# Patient Record
Sex: FEMALE | Race: WHITE | NOT HISPANIC OR LATINO | Employment: FULL TIME
[De-identification: names, ages, dates, MRNs, and addresses within clinical notes are randomized per-mention and may not be internally consistent; named-entity substitution may affect disease eponyms.]

---

## 2017-01-26 ENCOUNTER — HOSPITAL (OUTPATIENT)
Dept: OTHER | Age: 82
End: 2017-01-26
Attending: FAMILY MEDICINE

## 2017-01-30 PROBLEM — I10 BENIGN HYPERTENSION: Status: ACTIVE | Noted: 2017-01-30

## 2017-01-30 PROBLEM — J34.89 SINUS DRAINAGE: Status: ACTIVE | Noted: 2017-01-30

## 2017-01-30 PROBLEM — K57.92 DIVERTICULITIS: Status: ACTIVE | Noted: 2017-01-30

## 2017-02-22 ENCOUNTER — APPOINTMENT (OUTPATIENT)
Dept: LAB | Age: 82
End: 2017-02-22
Attending: INTERNAL MEDICINE
Payer: MEDICARE

## 2017-02-22 DIAGNOSIS — D47.3 ESSENTIAL THROMBOCYTOSIS (HCC): ICD-10-CM

## 2017-02-22 PROCEDURE — 85670 THROMBIN TIME PLASMA: CPT | Performed by: INTERNAL MEDICINE

## 2017-02-22 PROCEDURE — 85705 THROMBOPLASTIN INHIBITION: CPT | Performed by: INTERNAL MEDICINE

## 2017-02-22 PROCEDURE — 85300 ANTITHROMBIN III ACTIVITY: CPT | Performed by: INTERNAL MEDICINE

## 2017-02-22 PROCEDURE — 86147 CARDIOLIPIN ANTIBODY EA IG: CPT | Performed by: INTERNAL MEDICINE

## 2017-02-22 PROCEDURE — 85305 CLOT INHIBIT PROT S TOTAL: CPT | Performed by: INTERNAL MEDICINE

## 2017-02-22 PROCEDURE — 85613 RUSSELL VIPER VENOM DILUTED: CPT | Performed by: INTERNAL MEDICINE

## 2017-02-22 PROCEDURE — 86146 BETA-2 GLYCOPROTEIN ANTIBODY: CPT | Performed by: INTERNAL MEDICINE

## 2017-02-22 PROCEDURE — 86038 ANTINUCLEAR ANTIBODIES: CPT | Performed by: INTERNAL MEDICINE

## 2017-02-22 PROCEDURE — 81270 JAK2 GENE: CPT

## 2017-02-22 PROCEDURE — 85306 CLOT INHIBIT PROT S FREE: CPT | Performed by: INTERNAL MEDICINE

## 2017-02-22 PROCEDURE — 81240 F2 GENE: CPT | Performed by: INTERNAL MEDICINE

## 2017-02-22 PROCEDURE — 81241 F5 GENE: CPT | Performed by: INTERNAL MEDICINE

## 2017-02-22 PROCEDURE — 85732 THROMBOPLASTIN TIME PARTIAL: CPT | Performed by: INTERNAL MEDICINE

## 2017-02-22 PROCEDURE — 85303 CLOT INHIBIT PROT C ACTIVITY: CPT | Performed by: INTERNAL MEDICINE

## 2017-02-26 LAB — JAK2 GENE, V617F MUTATION, QUALITATIVE: POSITIVE

## 2017-03-07 PROBLEM — D47.3 ESSENTIAL THROMBOCYTOSIS (HCC): Status: ACTIVE | Noted: 2017-03-07

## 2017-04-11 PROBLEM — K59.00 CONSTIPATION: Status: ACTIVE | Noted: 2017-04-11

## 2017-08-15 ENCOUNTER — HOSPITAL (OUTPATIENT)
Dept: OTHER | Age: 82
End: 2017-08-15
Attending: HOSPITALIST

## 2017-08-15 LAB
ALBUMIN SERPL-MCNC: 3.9 GM/DL (ref 3.6–5.1)
ALBUMIN/GLOB SERPL: 1.1 {RATIO} (ref 1–2.4)
ALP SERPL-CCNC: 81 UNIT/L (ref 45–117)
ALT SERPL-CCNC: 26 UNIT/L
AMORPH SED URNS QL MICRO: ABNORMAL
ANALYZER ANC (IANC): ABNORMAL
ANION GAP SERPL CALC-SCNC: 19 MMOL/L (ref 10–20)
APPEARANCE UR: CLEAR
AST SERPL-CCNC: 31 UNIT/L
BACTERIA #/AREA URNS HPF: ABNORMAL /HPF
BASOPHILS # BLD: 0.1 THOUSAND/MCL (ref 0–0.3)
BASOPHILS NFR BLD: 0 %
BILIRUB SERPL-MCNC: 1.3 MG/DL (ref 0.2–1)
BILIRUB UR QL: NEGATIVE
BUN SERPL-MCNC: 23 MG/DL (ref 6–20)
BUN/CREAT SERPL: 24 (ref 7–25)
CALCIUM SERPL-MCNC: 9.8 MG/DL (ref 8.4–10.2)
CAOX CRY URNS QL MICRO: ABNORMAL
CHLORIDE: 103 MMOL/L (ref 98–107)
CO2 SERPL-SCNC: 22 MMOL/L (ref 21–32)
COLOR UR: ABNORMAL
CREAT SERPL-MCNC: 0.96 MG/DL (ref 0.51–0.95)
DIFFERENTIAL METHOD BLD: ABNORMAL
EOSINOPHIL # BLD: 0 THOUSAND/MCL (ref 0.1–0.5)
EOSINOPHIL NFR BLD: 0 %
EPITH CASTS #/AREA URNS LPF: ABNORMAL /[LPF]
ERYTHROCYTE [DISTWIDTH] IN BLOOD: 13.3 % (ref 11–15)
FATTY CASTS #/AREA URNS LPF: ABNORMAL /[LPF]
GLOBULIN SER-MCNC: 3.4 GM/DL (ref 2–4)
GLUCOSE SERPL-MCNC: 214 MG/DL (ref 65–99)
GLUCOSE UR-MCNC: NEGATIVE MG/DL
GRAN CASTS #/AREA URNS LPF: ABNORMAL /[LPF]
HEMATOCRIT: 50.6 % (ref 36–46.5)
HGB BLD-MCNC: 18 GM/DL (ref 12–15.5)
HGB UR QL: NEGATIVE
HYALINE CASTS #/AREA URNS LPF: ABNORMAL /LPF (ref 0–5)
KETONES UR-MCNC: ABNORMAL MG/DL
LACTATE BLDV-MCNC: 2.9 MMOL/L
LACTATE BLDV-MCNC: 5.1 MMOL/L
LEUKOCYTE ESTERASE UR QL STRIP: NEGATIVE
LIPASE SERPL-CCNC: 165 UNIT/L (ref 73–393)
LYMPHOCYTES # BLD: 1.8 THOUSAND/MCL (ref 1–4)
LYMPHOCYTES NFR BLD: 6 %
MCH RBC QN AUTO: 30.5 PG (ref 26–34)
MCHC RBC AUTO-ENTMCNC: 35.6 GM/DL (ref 32–36.5)
MCV RBC AUTO: 85.6 FL (ref 78–100)
MIXED CELL CASTS #/AREA URNS LPF: ABNORMAL /[LPF]
MONOCYTES # BLD: 1 THOUSAND/MCL (ref 0.3–0.9)
MONOCYTES NFR BLD: 3 %
MUCOUS THREADS URNS QL MICRO: PRESENT
NEUTROPHILS # BLD: 27.7 THOUSAND/MCL (ref 1.8–7.7)
NEUTROPHILS NFR BLD: 91 %
NEUTS SEG NFR BLD: ABNORMAL %
NITRITE UR QL: NEGATIVE
PERCENT NRBC: 0
PH UR: 5 UNIT (ref 5–7)
PLATELET # BLD: 494 THOUSAND/MCL (ref 140–450)
POTASSIUM SERPL-SCNC: 4 MMOL/L (ref 3.4–5.1)
PROCALCITONIN SERPL IA-MCNC: <0.05 NG/ML
PROT SERPL-MCNC: 7.3 GM/DL (ref 6.4–8.2)
PROT UR QL: NEGATIVE MG/DL
RBC # BLD: 5.91 MILLION/MCL (ref 4–5.2)
RBC #/AREA URNS HPF: ABNORMAL /HPF (ref 0–3)
RBC CASTS #/AREA URNS LPF: ABNORMAL /[LPF]
RENAL EPI CELLS #/AREA URNS HPF: ABNORMAL /[HPF]
SODIUM SERPL-SCNC: 140 MMOL/L (ref 135–145)
SP GR UR: 1.01 (ref 1–1.03)
SPECIMEN SOURCE: ABNORMAL
SPERM URNS QL MICRO: ABNORMAL
SQUAMOUS #/AREA URNS HPF: ABNORMAL /HPF (ref 0–5)
T VAGINALIS URNS QL MICRO: ABNORMAL
TRI-PHOS CRY URNS QL MICRO: ABNORMAL
URATE CRY URNS QL MICRO: ABNORMAL
URINE REFLEX: ABNORMAL
URNS CMNT MICRO: ABNORMAL
UROBILINOGEN UR QL: 4 MG/DL (ref 0–1)
WAXY CASTS #/AREA URNS LPF: ABNORMAL /[LPF]
WBC # BLD: 30.6 THOUSAND/MCL (ref 4.2–11)
WBC #/AREA URNS HPF: ABNORMAL /HPF (ref 0–5)
WBC CASTS #/AREA URNS LPF: ABNORMAL /[LPF]
YEAST HYPHAE URNS QL MICRO: ABNORMAL
YEAST URNS QL MICRO: ABNORMAL

## 2017-08-16 ENCOUNTER — DIAGNOSTIC TRANS (OUTPATIENT)
Dept: OTHER | Age: 82
End: 2017-08-16

## 2017-08-16 ENCOUNTER — CHARTING TRANS (OUTPATIENT)
Dept: OTHER | Age: 82
End: 2017-08-16

## 2017-08-16 LAB
ANALYZER ANC (IANC): ABNORMAL
ANION GAP SERPL CALC-SCNC: 13 MMOL/L (ref 10–20)
BASOPHILS # BLD: 0 THOUSAND/MCL (ref 0–0.3)
BASOPHILS NFR BLD: 0 %
BUN SERPL-MCNC: 21 MG/DL (ref 6–20)
BUN/CREAT SERPL: 21 (ref 7–25)
CALCIUM SERPL-MCNC: 7.7 MG/DL (ref 8.4–10.2)
CHLORIDE: 108 MMOL/L (ref 98–107)
CO2 SERPL-SCNC: 23 MMOL/L (ref 21–32)
CREAT SERPL-MCNC: 0.98 MG/DL (ref 0.51–0.95)
DIFFERENTIAL METHOD BLD: ABNORMAL
EOSINOPHIL # BLD: 0 THOUSAND/MCL (ref 0.1–0.5)
EOSINOPHIL NFR BLD: 0 %
ERYTHROCYTE [DISTWIDTH] IN BLOOD: 13.5 % (ref 11–15)
GLUCOSE BLDC GLUCOMTR-MCNC: 105 MG/DL (ref 65–99)
GLUCOSE BLDC GLUCOMTR-MCNC: 113 MG/DL (ref 65–99)
GLUCOSE BLDC GLUCOMTR-MCNC: 127 MG/DL (ref 65–99)
GLUCOSE BLDC GLUCOMTR-MCNC: 150 MG/DL (ref 65–99)
GLUCOSE BLDC GLUCOMTR-MCNC: 75 MG/DL (ref 65–99)
GLUCOSE SERPL-MCNC: 132 MG/DL (ref 65–99)
GLUCOSE SERPL-MCNC: 186 MG/DL (ref 65–99)
HEMATOCRIT: 38.4 % (ref 36–46.5)
HEMATOCRIT: 43 % (ref 36–46.5)
HGB BLD-MCNC: 13.2 GM/DL (ref 12–15.5)
HGB BLD-MCNC: 14.9 GM/DL (ref 12–15.5)
LACTATE BLDV-SCNC: 0.9 MMOL/L (ref 0–2)
LACTATE BLDV-SCNC: 1.7 MMOL/L (ref 0–2)
LACTATE BLDV-SCNC: 2.6 MMOL/L (ref 0–2)
LYMPHOCYTES # BLD: 0.7 THOUSAND/MCL (ref 1–4)
LYMPHOCYTES NFR BLD: 3 %
MCH RBC QN AUTO: 29.3 PG (ref 26–34)
MCHC RBC AUTO-ENTMCNC: 34.7 GM/DL (ref 32–36.5)
MCV RBC AUTO: 84.5 FL (ref 78–100)
MONOCYTES # BLD: 0.9 THOUSAND/MCL (ref 0.3–0.9)
MONOCYTES NFR BLD: 4 %
NEUTROPHILS # BLD: 20.1 THOUSAND/MCL (ref 1.8–7.7)
NEUTROPHILS NFR BLD: 93 %
NEUTS SEG NFR BLD: ABNORMAL %
PERCENT NRBC: ABNORMAL
PLATELET # BLD: 383 THOUSAND/MCL (ref 140–450)
POTASSIUM SERPL-SCNC: 4.2 MMOL/L (ref 3.4–5.1)
RBC # BLD: 5.09 MILLION/MCL (ref 4–5.2)
SODIUM SERPL-SCNC: 140 MMOL/L (ref 135–145)
WBC # BLD: 21.8 THOUSAND/MCL (ref 4.2–11)

## 2017-08-17 LAB
ANALYZER ANC (IANC): ABNORMAL
ANION GAP SERPL CALC-SCNC: 11 MMOL/L (ref 10–20)
BUN SERPL-MCNC: 12 MG/DL (ref 6–20)
BUN/CREAT SERPL: 15 (ref 7–25)
CALCIUM SERPL-MCNC: 7.7 MG/DL (ref 8.4–10.2)
CHLORIDE: 109 MMOL/L (ref 98–107)
CMV DNA # BLD NAA+PROBE: NOT DETECTED UNIT/ML
CMV DNA SERPL NAA+PROBE-LOG#: NOT DETECTED UNIT/ML
CMV IGG SERPL IA-ACNC: 4.02 ISR
CMV IGM SERPL IA-ACNC: 0.39 OD
CO2 SERPL-SCNC: 23 MMOL/L (ref 21–32)
CREAT SERPL-MCNC: 0.82 MG/DL (ref 0.51–0.95)
ERYTHROCYTE [DISTWIDTH] IN BLOOD: 13.9 % (ref 11–15)
GLUCOSE BLDC GLUCOMTR-MCNC: 103 MG/DL (ref 65–99)
GLUCOSE BLDC GLUCOMTR-MCNC: 110 MG/DL (ref 65–99)
GLUCOSE BLDC GLUCOMTR-MCNC: 87 MG/DL (ref 65–99)
GLUCOSE BLDC GLUCOMTR-MCNC: 94 MG/DL (ref 65–99)
GLUCOSE SERPL-MCNC: 97 MG/DL (ref 65–99)
GLYCOHEMOGLOBIN: 5 % (ref 4.5–5.6)
HEMATOCRIT: 38.3 % (ref 36–46.5)
HEMATOCRIT: 40.2 % (ref 36–46.5)
HGB BLD-MCNC: 12.8 GM/DL (ref 12–15.5)
HGB BLD-MCNC: 13.5 GM/DL (ref 12–15.5)
MAGNESIUM SERPL-MCNC: 1.5 MG/DL (ref 1.7–2.4)
MCH RBC QN AUTO: 28.6 PG (ref 26–34)
MCHC RBC AUTO-ENTMCNC: 33.4 GM/DL (ref 32–36.5)
MCV RBC AUTO: 85.7 FL (ref 78–100)
PHOSPHATE SERPL-MCNC: 2.1 MG/DL (ref 2.4–4.7)
PLATELET # BLD: 294 THOUSAND/MCL (ref 140–450)
POTASSIUM SERPL-SCNC: 3.6 MMOL/L (ref 3.4–5.1)
RBC # BLD: 4.47 MILLION/MCL (ref 4–5.2)
SODIUM SERPL-SCNC: 139 MMOL/L (ref 135–145)
SPECIMEN SOURCE: NORMAL
WBC # BLD: 15 THOUSAND/MCL (ref 4.2–11)

## 2017-08-18 LAB
ANALYZER ANC (IANC): ABNORMAL
ANION GAP SERPL CALC-SCNC: 15 MMOL/L (ref 10–20)
BUN SERPL-MCNC: 11 MG/DL (ref 6–20)
BUN/CREAT SERPL: 14 (ref 7–25)
CALCIUM SERPL-MCNC: 7.6 MG/DL (ref 8.4–10.2)
CHLORIDE: 107 MMOL/L (ref 98–107)
CO2 SERPL-SCNC: 20 MMOL/L (ref 21–32)
CREAT SERPL-MCNC: 0.78 MG/DL (ref 0.51–0.95)
ERYTHROCYTE [DISTWIDTH] IN BLOOD: 13.4 % (ref 11–15)
GLUCOSE BLDC GLUCOMTR-MCNC: 110 MG/DL (ref 65–99)
GLUCOSE BLDC GLUCOMTR-MCNC: 71 MG/DL (ref 65–99)
GLUCOSE BLDC GLUCOMTR-MCNC: 85 MG/DL (ref 65–99)
GLUCOSE SERPL-MCNC: 78 MG/DL (ref 65–99)
HEMATOCRIT: 37.4 % (ref 36–46.5)
HGB BLD-MCNC: 13 GM/DL (ref 12–15.5)
MAGNESIUM SERPL-MCNC: 1.9 MG/DL (ref 1.7–2.4)
MCH RBC QN AUTO: 29.7 PG (ref 26–34)
MCHC RBC AUTO-ENTMCNC: 34.8 GM/DL (ref 32–36.5)
MCV RBC AUTO: 85.4 FL (ref 78–100)
PLATELET # BLD: 280 THOUSAND/MCL (ref 140–450)
POTASSIUM SERPL-SCNC: 3.9 MMOL/L (ref 3.4–5.1)
RBC # BLD: 4.38 MILLION/MCL (ref 4–5.2)
SODIUM SERPL-SCNC: 138 MMOL/L (ref 135–145)
WBC # BLD: 15 THOUSAND/MCL (ref 4.2–11)

## 2017-08-19 LAB
ANALYZER ANC (IANC): ABNORMAL
ANION GAP SERPL CALC-SCNC: 14 MMOL/L (ref 10–20)
BUN SERPL-MCNC: 8 MG/DL (ref 6–20)
BUN/CREAT SERPL: 11 (ref 7–25)
CALCIUM SERPL-MCNC: 7.8 MG/DL (ref 8.4–10.2)
CHLORIDE: 105 MMOL/L (ref 98–107)
CHOLEST SERPL-MCNC: 145 MG/DL
CHOLEST/HDLC SERPL: 2.7 {RATIO}
CO2 SERPL-SCNC: 22 MMOL/L (ref 21–32)
CREAT SERPL-MCNC: 0.75 MG/DL (ref 0.51–0.95)
ERYTHROCYTE [DISTWIDTH] IN BLOOD: 13.2 % (ref 11–15)
GLUCOSE SERPL-MCNC: 101 MG/DL (ref 65–99)
HDLC SERPL-MCNC: 53 MG/DL
HEMATOCRIT: 37.5 % (ref 36–46.5)
HGB BLD-MCNC: 12.9 GM/DL (ref 12–15.5)
LDLC SERPL CALC-MCNC: 70 MG/DL
MCH RBC QN AUTO: 29 PG (ref 26–34)
MCHC RBC AUTO-ENTMCNC: 34.4 GM/DL (ref 32–36.5)
MCV RBC AUTO: 84.3 FL (ref 78–100)
NONHDLC SERPL-MCNC: 92 MG/DL
PLATELET # BLD: 294 THOUSAND/MCL (ref 140–450)
POTASSIUM SERPL-SCNC: 3.7 MMOL/L (ref 3.4–5.1)
RBC # BLD: 4.45 MILLION/MCL (ref 4–5.2)
SODIUM SERPL-SCNC: 137 MMOL/L (ref 135–145)
TRIGLYCERIDE (TRIGP): 112 MG/DL
WBC # BLD: 14.2 THOUSAND/MCL (ref 4.2–11)

## 2017-08-20 LAB
ANALYZER ANC (IANC): ABNORMAL
ANION GAP SERPL CALC-SCNC: 17 MMOL/L (ref 10–20)
BUN SERPL-MCNC: 10 MG/DL (ref 6–20)
BUN/CREAT SERPL: 12 (ref 7–25)
CALCIUM SERPL-MCNC: 7.9 MG/DL (ref 8.4–10.2)
CHLORIDE: 104 MMOL/L (ref 98–107)
CO2 SERPL-SCNC: 22 MMOL/L (ref 21–32)
CREAT SERPL-MCNC: 0.83 MG/DL (ref 0.51–0.95)
ERYTHROCYTE [DISTWIDTH] IN BLOOD: 13.3 % (ref 11–15)
GLUCOSE SERPL-MCNC: 81 MG/DL (ref 65–99)
HEMATOCRIT: 36.7 % (ref 36–46.5)
HGB BLD-MCNC: 12.7 GM/DL (ref 12–15.5)
MAGNESIUM SERPL-MCNC: 1.5 MG/DL (ref 1.7–2.4)
MCH RBC QN AUTO: 29.1 PG (ref 26–34)
MCHC RBC AUTO-ENTMCNC: 34.6 GM/DL (ref 32–36.5)
MCV RBC AUTO: 84 FL (ref 78–100)
PLATELET # BLD: 335 THOUSAND/MCL (ref 140–450)
POTASSIUM SERPL-SCNC: 3.5 MMOL/L (ref 3.4–5.1)
RBC # BLD: 4.37 MILLION/MCL (ref 4–5.2)
SODIUM SERPL-SCNC: 139 MMOL/L (ref 135–145)
WBC # BLD: 16.4 THOUSAND/MCL (ref 4.2–11)

## 2017-08-21 LAB
ANALYZER ANC (IANC): ABNORMAL
ANION GAP SERPL CALC-SCNC: 13 MMOL/L (ref 10–20)
BUN SERPL-MCNC: 6 MG/DL (ref 6–20)
BUN/CREAT SERPL: 8 (ref 7–25)
CALCIUM SERPL-MCNC: 7.8 MG/DL (ref 8.4–10.2)
CHLORIDE: 107 MMOL/L (ref 98–107)
CO2 SERPL-SCNC: 24 MMOL/L (ref 21–32)
CREAT SERPL-MCNC: 0.71 MG/DL (ref 0.51–0.95)
ERYTHROCYTE [DISTWIDTH] IN BLOOD: 13.5 % (ref 11–15)
GLUCOSE BLDC GLUCOMTR-MCNC: 71 MG/DL (ref 65–99)
GLUCOSE SERPL-MCNC: 123 MG/DL (ref 65–99)
HEMATOCRIT: 34.8 % (ref 36–46.5)
HGB BLD-MCNC: 11.9 GM/DL (ref 12–15.5)
MAGNESIUM SERPL-MCNC: 1.6 MG/DL (ref 1.7–2.4)
MCH RBC QN AUTO: 28.8 PG (ref 26–34)
MCHC RBC AUTO-ENTMCNC: 34.2 GM/DL (ref 32–36.5)
MCV RBC AUTO: 84.3 FL (ref 78–100)
PHOSPHATE SERPL-MCNC: 2.3 MG/DL (ref 2.4–4.7)
PLATELET # BLD: 307 THOUSAND/MCL (ref 140–450)
POTASSIUM SERPL-SCNC: 3.8 MMOL/L (ref 3.4–5.1)
RBC # BLD: 4.13 MILLION/MCL (ref 4–5.2)
SODIUM SERPL-SCNC: 140 MMOL/L (ref 135–145)
WBC # BLD: 10.3 THOUSAND/MCL (ref 4.2–11)

## 2017-08-22 LAB
ALBUMIN SERPL-MCNC: 2.1 GM/DL (ref 3.6–5.1)
ALBUMIN/GLOB SERPL: 0.6 {RATIO} (ref 1–2.4)
ALP SERPL-CCNC: 149 UNIT/L (ref 45–117)
ALT SERPL-CCNC: 16 UNIT/L
ANION GAP SERPL CALC-SCNC: 16 MMOL/L (ref 10–20)
AST SERPL-CCNC: 19 UNIT/L
BILIRUB SERPL-MCNC: 0.6 MG/DL (ref 0.2–1)
BUN SERPL-MCNC: 6 MG/DL (ref 6–20)
BUN/CREAT SERPL: 8 (ref 7–25)
CALCIUM SERPL-MCNC: 7.9 MG/DL (ref 8.4–10.2)
CHLORIDE: 104 MMOL/L (ref 98–107)
CO2 SERPL-SCNC: 25 MMOL/L (ref 21–32)
CREAT SERPL-MCNC: 0.71 MG/DL (ref 0.51–0.95)
GLOBULIN SER-MCNC: 3.5 GM/DL (ref 2–4)
GLUCOSE BLDC GLUCOMTR-MCNC: 129 MG/DL (ref 65–99)
GLUCOSE BLDC GLUCOMTR-MCNC: 144 MG/DL (ref 65–99)
GLUCOSE BLDC GLUCOMTR-MCNC: 144 MG/DL (ref 65–99)
GLUCOSE SERPL-MCNC: 120 MG/DL (ref 65–99)
MAGNESIUM SERPL-MCNC: 1.7 MG/DL (ref 1.7–2.4)
PHOSPHATE SERPL-MCNC: 3.1 MG/DL (ref 2.4–4.7)
POTASSIUM SERPL-SCNC: 3.6 MMOL/L (ref 3.4–5.1)
PROT SERPL-MCNC: 5.6 GM/DL (ref 6.4–8.2)
SODIUM SERPL-SCNC: 141 MMOL/L (ref 135–145)

## 2017-08-23 LAB
ANION GAP SERPL CALC-SCNC: 13 MMOL/L (ref 10–20)
BUN SERPL-MCNC: 13 MG/DL (ref 6–20)
BUN/CREAT SERPL: 17 (ref 7–25)
CALCIUM SERPL-MCNC: 8 MG/DL (ref 8.4–10.2)
CHLORIDE: 104 MMOL/L (ref 98–107)
CO2 SERPL-SCNC: 26 MMOL/L (ref 21–32)
CREAT SERPL-MCNC: 0.76 MG/DL (ref 0.51–0.95)
GLUCOSE BLDC GLUCOMTR-MCNC: 106 MG/DL (ref 65–99)
GLUCOSE BLDC GLUCOMTR-MCNC: 107 MG/DL (ref 65–99)
GLUCOSE BLDC GLUCOMTR-MCNC: 117 MG/DL (ref 65–99)
GLUCOSE BLDC GLUCOMTR-MCNC: 129 MG/DL (ref 65–99)
GLUCOSE SERPL-MCNC: 115 MG/DL (ref 65–99)
MAGNESIUM SERPL-MCNC: 1.8 MG/DL (ref 1.7–2.4)
PHOSPHATE SERPL-MCNC: 3.9 MG/DL (ref 2.4–4.7)
POTASSIUM SERPL-SCNC: 4.1 MMOL/L (ref 3.4–5.1)
SODIUM SERPL-SCNC: 139 MMOL/L (ref 135–145)

## 2017-08-24 LAB
ALBUMIN SERPL-MCNC: 2.2 GM/DL (ref 3.6–5.1)
ALBUMIN/GLOB SERPL: 0.7 {RATIO} (ref 1–2.4)
ALP SERPL-CCNC: 112 UNIT/L (ref 45–117)
ALT SERPL-CCNC: 17 UNIT/L
ANION GAP SERPL CALC-SCNC: 9 MMOL/L (ref 10–20)
AST SERPL-CCNC: 25 UNIT/L
BILIRUB SERPL-MCNC: 0.4 MG/DL (ref 0.2–1)
BUN SERPL-MCNC: 14 MG/DL (ref 6–20)
BUN/CREAT SERPL: 19 (ref 7–25)
CALCIUM SERPL-MCNC: 8.2 MG/DL (ref 8.4–10.2)
CHLORIDE: 106 MMOL/L (ref 98–107)
CO2 SERPL-SCNC: 28 MMOL/L (ref 21–32)
CREAT SERPL-MCNC: 0.73 MG/DL (ref 0.51–0.95)
GLOBULIN SER-MCNC: 3.2 GM/DL (ref 2–4)
GLUCOSE BLDC GLUCOMTR-MCNC: 116 MG/DL (ref 65–99)
GLUCOSE BLDC GLUCOMTR-MCNC: 126 MG/DL (ref 65–99)
GLUCOSE BLDC GLUCOMTR-MCNC: 86 MG/DL (ref 65–99)
GLUCOSE SERPL-MCNC: 117 MG/DL (ref 65–99)
MAGNESIUM SERPL-MCNC: 1.8 MG/DL (ref 1.7–2.4)
PHOSPHATE SERPL-MCNC: 3.5 MG/DL (ref 2.4–4.7)
POTASSIUM SERPL-SCNC: 4.2 MMOL/L (ref 3.4–5.1)
PROT SERPL-MCNC: 5.4 GM/DL (ref 6.4–8.2)
SODIUM SERPL-SCNC: 139 MMOL/L (ref 135–145)

## 2018-06-20 PROBLEM — D47.3 ESSENTIAL THROMBOCYTOSIS (HCC): Status: RESOLVED | Noted: 2017-03-07 | Resolved: 2018-06-20

## 2018-06-20 PROBLEM — K59.00 CONSTIPATION: Status: RESOLVED | Noted: 2017-04-11 | Resolved: 2018-06-20

## 2018-06-20 PROBLEM — I10 BENIGN HYPERTENSION: Status: RESOLVED | Noted: 2017-01-30 | Resolved: 2018-06-20

## 2018-06-20 PROBLEM — K57.92 DIVERTICULITIS: Status: RESOLVED | Noted: 2017-01-30 | Resolved: 2018-06-20

## 2018-11-02 PROBLEM — I70.0 ABDOMINAL AORTIC ATHEROSCLEROSIS (HCC): Status: ACTIVE | Noted: 2018-11-02

## 2018-11-02 PROBLEM — M51.37 DEGENERATION OF LUMBAR OR LUMBOSACRAL INTERVERTEBRAL DISC: Status: ACTIVE | Noted: 2018-11-02

## 2018-11-02 PROBLEM — M51.379 DEGENERATION OF LUMBAR OR LUMBOSACRAL INTERVERTEBRAL DISC: Status: ACTIVE | Noted: 2018-11-02

## 2018-11-02 PROBLEM — M54.50 CHRONIC BILATERAL LOW BACK PAIN WITHOUT SCIATICA: Status: ACTIVE | Noted: 2018-11-02

## 2018-11-02 PROBLEM — G89.29 CHRONIC BILATERAL LOW BACK PAIN WITHOUT SCIATICA: Status: ACTIVE | Noted: 2018-11-02

## 2018-11-19 PROBLEM — G89.29 CHRONIC LEFT-SIDED LOW BACK PAIN WITHOUT SCIATICA: Status: ACTIVE | Noted: 2018-11-19

## 2018-11-19 PROBLEM — M25.552 CHRONIC LEFT HIP PAIN: Status: ACTIVE | Noted: 2018-11-19

## 2018-11-19 PROBLEM — M54.50 CHRONIC LEFT-SIDED LOW BACK PAIN WITHOUT SCIATICA: Status: ACTIVE | Noted: 2018-11-19

## 2018-11-19 PROBLEM — G89.29 CHRONIC LEFT HIP PAIN: Status: ACTIVE | Noted: 2018-11-19

## 2018-12-07 PROBLEM — M47.817 LUMBOSACRAL SPONDYLOSIS WITHOUT MYELOPATHY: Status: ACTIVE | Noted: 2018-12-07

## 2018-12-07 PROBLEM — M48.061 LUMBAR FORAMINAL STENOSIS: Status: ACTIVE | Noted: 2018-12-07

## 2018-12-28 PROBLEM — M46.1 SACROILIITIS, NOT ELSEWHERE CLASSIFIED (HCC): Status: ACTIVE | Noted: 2018-12-28

## 2019-01-31 PROBLEM — M70.62 GREATER TROCHANTERIC BURSITIS OF LEFT HIP: Status: ACTIVE | Noted: 2019-01-31

## 2020-05-21 PROBLEM — M16.12 OSTEOARTHRITIS OF LEFT HIP, UNSPECIFIED OSTEOARTHRITIS TYPE: Status: ACTIVE | Noted: 2020-05-21

## 2020-05-21 PROBLEM — M25.552 LEFT HIP PAIN: Status: ACTIVE | Noted: 2018-11-19

## 2020-08-15 ENCOUNTER — HOSPITAL ENCOUNTER (OUTPATIENT)
Dept: CT IMAGING | Age: 85
Discharge: HOME | End: 2020-08-15
Attending: EMERGENCY MEDICINE

## 2020-08-15 ENCOUNTER — WALK IN (OUTPATIENT)
Dept: URGENT CARE | Age: 85
End: 2020-08-15
Attending: EMERGENCY MEDICINE

## 2020-08-15 DIAGNOSIS — R10.9 ABDOMINAL PAIN, UNSPECIFIED ABDOMINAL LOCATION: ICD-10-CM

## 2020-08-15 DIAGNOSIS — R10.32 LLQ PAIN: ICD-10-CM

## 2020-08-15 DIAGNOSIS — R10.32 LLQ PAIN: Primary | ICD-10-CM

## 2020-08-15 PROCEDURE — 99212 OFFICE O/P EST SF 10 MIN: CPT

## 2020-08-15 PROCEDURE — 74176 CT ABD & PELVIS W/O CONTRAST: CPT

## 2020-08-15 RX ORDER — AMLODIPINE BESYLATE 2.5 MG/1
2.5 TABLET ORAL
COMMUNITY
Start: 2020-07-21

## 2020-08-15 RX ORDER — AMOXICILLIN AND CLAVULANATE POTASSIUM 875; 125 MG/1; MG/1
1 TABLET, FILM COATED ORAL 2 TIMES DAILY
Qty: 20 TABLET | Refills: 0 | Status: SHIPPED | OUTPATIENT
Start: 2020-08-15 | End: 2020-08-25

## 2020-08-15 RX ORDER — ASPIRIN 325 MG
TABLET ORAL
COMMUNITY

## 2020-08-15 RX ORDER — CELECOXIB 200 MG/1
200 CAPSULE ORAL
COMMUNITY
Start: 2019-11-21

## 2020-08-15 RX ORDER — METOPROLOL SUCCINATE 50 MG/1
50 TABLET, EXTENDED RELEASE ORAL
COMMUNITY
Start: 2019-07-24

## 2020-08-15 RX ORDER — TIMOLOL MALEATE 5 MG/ML
1 SOLUTION/ DROPS OPHTHALMIC
COMMUNITY
Start: 2019-11-21

## 2020-08-15 RX ORDER — LATANOPROST 50 UG/ML
1 SOLUTION/ DROPS OPHTHALMIC
COMMUNITY
Start: 2019-11-21

## 2020-08-15 SDOH — HEALTH STABILITY: MENTAL HEALTH: HOW OFTEN DO YOU HAVE A DRINK CONTAINING ALCOHOL?: NEVER

## 2020-08-15 ASSESSMENT — PAIN SCALES - GENERAL: PAINLEVEL: 5-6

## 2020-08-15 ASSESSMENT — ENCOUNTER SYMPTOMS
BRUISES/BLEEDS EASILY: 0
CHILLS: 0
HEADACHES: 0
SORE THROAT: 0
ABDOMINAL PAIN: 1
VOMITING: 0
DIZZINESS: 0
BACK PAIN: 0
FEVER: 0
SHORTNESS OF BREATH: 0
COUGH: 0

## 2020-11-04 PROBLEM — N18.30 STAGE 3 CHRONIC KIDNEY DISEASE, UNSPECIFIED WHETHER STAGE 3A OR 3B CKD (HCC): Status: ACTIVE | Noted: 2020-11-04

## 2021-05-26 VITALS
OXYGEN SATURATION: 97 % | HEART RATE: 72 BPM | DIASTOLIC BLOOD PRESSURE: 69 MMHG | WEIGHT: 130 LBS | TEMPERATURE: 98.6 F | RESPIRATION RATE: 18 BRPM | SYSTOLIC BLOOD PRESSURE: 152 MMHG

## 2021-07-02 PROBLEM — D58.2 ELEVATED HEMOGLOBIN (HCC): Status: ACTIVE | Noted: 2021-07-02

## 2021-07-02 PROBLEM — K59.1 FUNCTIONAL DIARRHEA: Status: ACTIVE | Noted: 2021-07-02

## 2021-07-02 PROBLEM — M81.0 AGE-RELATED OSTEOPOROSIS WITHOUT CURRENT PATHOLOGICAL FRACTURE: Status: ACTIVE | Noted: 2021-07-02

## 2021-08-18 PROBLEM — Z15.89 JAK2 V617F MUTATION: Status: ACTIVE | Noted: 2021-08-18

## 2021-08-18 PROBLEM — R10.9 ABDOMINAL CRAMPING: Status: ACTIVE | Noted: 2021-08-18

## 2021-09-29 PROBLEM — D50.9 IRON DEFICIENCY ANEMIA: Status: ACTIVE | Noted: 2021-09-29

## 2021-10-04 PROBLEM — D45 PV (POLYCYTHEMIA VERA) (HCC): Status: ACTIVE | Noted: 2021-10-04

## 2023-03-07 ENCOUNTER — APPOINTMENT (OUTPATIENT)
Dept: CT IMAGING | Age: 88
End: 2023-03-07
Attending: STUDENT IN AN ORGANIZED HEALTH CARE EDUCATION/TRAINING PROGRAM

## 2023-03-07 ENCOUNTER — HOSPITAL ENCOUNTER (EMERGENCY)
Age: 88
Discharge: HOME | End: 2023-03-07
Attending: STUDENT IN AN ORGANIZED HEALTH CARE EDUCATION/TRAINING PROGRAM

## 2023-03-07 VITALS
BODY MASS INDEX: 18.58 KG/M2 | DIASTOLIC BLOOD PRESSURE: 62 MMHG | RESPIRATION RATE: 18 BRPM | HEART RATE: 87 BPM | SYSTOLIC BLOOD PRESSURE: 127 MMHG | HEIGHT: 59 IN | WEIGHT: 92.15 LBS | TEMPERATURE: 98 F | OXYGEN SATURATION: 99 %

## 2023-03-07 DIAGNOSIS — K42.9 UMBILICAL HERNIA WITHOUT OBSTRUCTION AND WITHOUT GANGRENE: ICD-10-CM

## 2023-03-07 DIAGNOSIS — R10.9 ABDOMINAL PAIN, UNSPECIFIED ABDOMINAL LOCATION: Primary | ICD-10-CM

## 2023-03-07 LAB
ALBUMIN SERPL-MCNC: 3.9 G/DL (ref 3.6–5.1)
ALBUMIN/GLOB SERPL: 1.2 {RATIO} (ref 1–2.4)
ALP SERPL-CCNC: 81 UNITS/L (ref 45–117)
ALT SERPL-CCNC: 69 UNITS/L
ANION GAP SERPL CALC-SCNC: 11 MMOL/L (ref 7–19)
AST SERPL-CCNC: 74 UNITS/L
BASOPHILS # BLD: 0 K/MCL (ref 0–0.3)
BASOPHILS NFR BLD: 0 %
BILIRUB SERPL-MCNC: 0.9 MG/DL (ref 0.2–1)
BUN SERPL-MCNC: 38 MG/DL (ref 6–20)
BUN/CREAT SERPL: 45 (ref 7–25)
CALCIUM SERPL-MCNC: 8.9 MG/DL (ref 8.4–10.2)
CHLORIDE SERPL-SCNC: 106 MMOL/L (ref 97–110)
CO2 SERPL-SCNC: 24 MMOL/L (ref 21–32)
CREAT SERPL-MCNC: 0.85 MG/DL (ref 0.51–0.95)
DEPRECATED RDW RBC: 46 FL (ref 39–50)
EOSINOPHIL # BLD: 0.1 K/MCL (ref 0–0.5)
EOSINOPHIL NFR BLD: 1 %
ERYTHROCYTE [DISTWIDTH] IN BLOOD: 14.8 % (ref 11–15)
FASTING DURATION TIME PATIENT: ABNORMAL H
GFR SERPLBLD BASED ON 1.73 SQ M-ARVRAT: 66 ML/MIN
GLOBULIN SER-MCNC: 3.3 G/DL (ref 2–4)
GLUCOSE SERPL-MCNC: 151 MG/DL (ref 70–99)
HCT VFR BLD CALC: 52.4 % (ref 36–46.5)
HGB BLD-MCNC: 17.4 G/DL (ref 12–15.5)
IMM GRANULOCYTES # BLD AUTO: 0.1 K/MCL (ref 0–0.2)
IMM GRANULOCYTES # BLD: 1 %
LIPASE SERPL-CCNC: 134 UNITS/L (ref 73–393)
LYMPHOCYTES # BLD: 0.3 K/MCL (ref 1–4)
LYMPHOCYTES NFR BLD: 3 %
MCH RBC QN AUTO: 28.6 PG (ref 26–34)
MCHC RBC AUTO-ENTMCNC: 33.2 G/DL (ref 32–36.5)
MCV RBC AUTO: 86.2 FL (ref 78–100)
MONOCYTES # BLD: 0.4 K/MCL (ref 0.3–0.9)
MONOCYTES NFR BLD: 4 %
NEUTROPHILS # BLD: 10.6 K/MCL (ref 1.8–7.7)
NEUTROPHILS NFR BLD: 91 %
NRBC BLD MANUAL-RTO: 0 /100 WBC
PLATELET # BLD AUTO: 242 K/MCL (ref 140–450)
POTASSIUM SERPL-SCNC: 4 MMOL/L (ref 3.4–5.1)
PROT SERPL-MCNC: 7.2 G/DL (ref 6.4–8.2)
RAINBOW EXTRA TUBES HOLD SPECIMEN: NORMAL
RBC # BLD: 6.08 MIL/MCL (ref 4–5.2)
SODIUM SERPL-SCNC: 137 MMOL/L (ref 135–145)
WBC # BLD: 11.6 K/MCL (ref 4.2–11)

## 2023-03-07 PROCEDURE — 10002801 HB RX 250 W/O HCPCS: Performed by: STUDENT IN AN ORGANIZED HEALTH CARE EDUCATION/TRAINING PROGRAM

## 2023-03-07 PROCEDURE — 80053 COMPREHEN METABOLIC PANEL: CPT | Performed by: STUDENT IN AN ORGANIZED HEALTH CARE EDUCATION/TRAINING PROGRAM

## 2023-03-07 PROCEDURE — 83690 ASSAY OF LIPASE: CPT | Performed by: STUDENT IN AN ORGANIZED HEALTH CARE EDUCATION/TRAINING PROGRAM

## 2023-03-07 PROCEDURE — 96374 THER/PROPH/DIAG INJ IV PUSH: CPT

## 2023-03-07 PROCEDURE — 74177 CT ABD & PELVIS W/CONTRAST: CPT

## 2023-03-07 PROCEDURE — 85025 COMPLETE CBC W/AUTO DIFF WBC: CPT | Performed by: STUDENT IN AN ORGANIZED HEALTH CARE EDUCATION/TRAINING PROGRAM

## 2023-03-07 PROCEDURE — 99284 EMERGENCY DEPT VISIT MOD MDM: CPT

## 2023-03-07 PROCEDURE — 10002800 HB RX 250 W HCPCS: Performed by: STUDENT IN AN ORGANIZED HEALTH CARE EDUCATION/TRAINING PROGRAM

## 2023-03-07 PROCEDURE — 10002805 HB CONTRAST AGENT: Performed by: STUDENT IN AN ORGANIZED HEALTH CARE EDUCATION/TRAINING PROGRAM

## 2023-03-07 PROCEDURE — 10002807 HB RX 258: Performed by: STUDENT IN AN ORGANIZED HEALTH CARE EDUCATION/TRAINING PROGRAM

## 2023-03-07 PROCEDURE — G1004 CDSM NDSC: HCPCS

## 2023-03-07 PROCEDURE — 96375 TX/PRO/DX INJ NEW DRUG ADDON: CPT

## 2023-03-07 RX ORDER — ONDANSETRON 4 MG/1
4 TABLET, ORALLY DISINTEGRATING ORAL EVERY 6 HOURS PRN
Qty: 7 TABLET | Refills: 0 | Status: SHIPPED | OUTPATIENT
Start: 2023-03-07 | End: 2023-03-10

## 2023-03-07 RX ORDER — ONDANSETRON 2 MG/ML
4 INJECTION INTRAMUSCULAR; INTRAVENOUS ONCE
Status: COMPLETED | OUTPATIENT
Start: 2023-03-07 | End: 2023-03-07

## 2023-03-07 RX ADMIN — SODIUM CHLORIDE, POTASSIUM CHLORIDE, SODIUM LACTATE AND CALCIUM CHLORIDE 1000 ML: 600; 310; 30; 20 INJECTION, SOLUTION INTRAVENOUS at 08:22

## 2023-03-07 RX ADMIN — ONDANSETRON 4 MG: 2 INJECTION INTRAMUSCULAR; INTRAVENOUS at 08:17

## 2023-03-07 RX ADMIN — IOHEXOL 80 ML: 350 INJECTION, SOLUTION INTRAVENOUS at 07:31

## 2023-03-07 RX ADMIN — FENTANYL CITRATE 25 MCG: 50 INJECTION INTRAMUSCULAR; INTRAVENOUS at 08:17

## 2023-03-07 ASSESSMENT — PAIN SCALES - GENERAL
PAINLEVEL_OUTOF10: 7
PAINLEVEL_OUTOF10: 5

## 2023-03-07 ASSESSMENT — PAIN DESCRIPTION - PAIN TYPE: TYPE: ACUTE PAIN

## 2024-10-08 ENCOUNTER — APPOINTMENT (OUTPATIENT)
Dept: HEMATOLOGY/ONCOLOGY | Age: 89
End: 2024-10-08
Payer: COMMERCIAL

## 2024-10-08 ENCOUNTER — NURSE ONLY (OUTPATIENT)
Dept: HEMATOLOGY/ONCOLOGY | Facility: HOSPITAL | Age: 89
End: 2024-10-08
Attending: INTERNAL MEDICINE
Payer: MEDICARE

## 2024-10-08 VITALS
RESPIRATION RATE: 18 BRPM | TEMPERATURE: 97 F | HEIGHT: 60 IN | BODY MASS INDEX: 20.54 KG/M2 | DIASTOLIC BLOOD PRESSURE: 63 MMHG | OXYGEN SATURATION: 95 % | SYSTOLIC BLOOD PRESSURE: 97 MMHG | WEIGHT: 104.63 LBS | HEART RATE: 68 BPM

## 2024-10-08 DIAGNOSIS — D45 PV (POLYCYTHEMIA VERA) (HCC): Primary | ICD-10-CM

## 2024-10-08 LAB
BASOPHILS # BLD AUTO: 0.11 X10(3) UL (ref 0–0.2)
BASOPHILS NFR BLD AUTO: 0.8 %
EOSINOPHIL # BLD AUTO: 0.13 X10(3) UL (ref 0–0.7)
EOSINOPHIL NFR BLD AUTO: 0.9 %
ERYTHROCYTE [DISTWIDTH] IN BLOOD BY AUTOMATED COUNT: 15.4 %
HCT VFR BLD AUTO: 50 %
HGB BLD-MCNC: 16.6 G/DL
IMM GRANULOCYTES # BLD AUTO: 0.11 X10(3) UL (ref 0–1)
IMM GRANULOCYTES NFR BLD: 0.8 %
LYMPHOCYTES # BLD AUTO: 1.55 X10(3) UL (ref 1–4)
LYMPHOCYTES NFR BLD AUTO: 10.6 %
MCH RBC QN AUTO: 29.1 PG (ref 26–34)
MCHC RBC AUTO-ENTMCNC: 33.2 G/DL (ref 31–37)
MCV RBC AUTO: 87.6 FL
MONOCYTES # BLD AUTO: 0.57 X10(3) UL (ref 0.1–1)
MONOCYTES NFR BLD AUTO: 3.9 %
NEUTROPHILS # BLD AUTO: 12.13 X10 (3) UL (ref 1.5–7.7)
NEUTROPHILS # BLD AUTO: 12.13 X10(3) UL (ref 1.5–7.7)
NEUTROPHILS NFR BLD AUTO: 83 %
PLATELET # BLD AUTO: 345 10(3)UL (ref 150–450)
RBC # BLD AUTO: 5.71 X10(6)UL
WBC # BLD AUTO: 14.6 X10(3) UL (ref 4–11)

## 2024-10-08 PROCEDURE — 36415 COLL VENOUS BLD VENIPUNCTURE: CPT

## 2024-10-08 PROCEDURE — 99195 PHLEBOTOMY: CPT

## 2024-10-08 PROCEDURE — 85025 COMPLETE CBC W/AUTO DIFF WBC: CPT

## 2024-10-08 NOTE — PROGRESS NOTES
Patient Name: Meliza Stafford  : 1935   Medical Record #: [id number]      Therapeutic Donor History    Donor History    When was your last healthy meal? 10:00 am    When was the last time you did any strenuous activities today? 8:00 am    Are you feeling ill or unhealthy today:  No    Do you currently have a cold, flu, sore throat, chills, respiratory infection, diarrhea, or any other active infections:  No    Have you had a dental procedure in the past 3 days:  No    Are you currently pregnant:  No    Are you allergic to iodine, latex, or chlorhexidine:  Yes   If yes, specify the type of reaction:  Latex - Rash    Have you ever had an adverse reaction to a blood donation or been deferred (refused) as a blood donor:  No    Have you had a blood disease, bleeding problem, or recently received blood products in the last 3 months:  No   If yes, please specify:    Have you had epilepsy, convulsions, or frequent fainting spells:  No   If yes, please specify:    History of chronic illness / major illness / surgery:  None of the above   List details on any of the above:    Are you taking pills, medications, or have you received any shots in the last 3 months:  Yes   Comments:    Current Outpatient Medications   Medication Sig Dispense Refill    cyclobenzaprine 5 MG Oral Tab Take 1 tablet (5 mg total) by mouth nightly. 30 tablet 0    cyclobenzaprine 10 MG Oral Tab Take 1 tablet (10 mg total) by mouth nightly. 30 tablet 3    Denosumab 60 MG/ML Subcutaneous Solution Prefilled Syringe Inject 1 mL (60 mg total) into the skin once. Historical documentation - see Epic Immunization Activity for administration details 1 mL 0    LATANOPROST 0.005 % Ophthalmic Solution INSTILL 1 drop into both eyes nightly. 7.5 mL 0    EPINEPHrine 0.3 MG/0.3ML Injection Solution Auto-injector As needed      Nystatin 250518 UNIT/GM External Powder Apply to affected area four times a day 60 g 2    hydroxyurea 500 MG Oral Cap Take 1 capsule  (500 mg total) by mouth daily. 30 capsule 5    LISINOPRIL 20 MG Oral Tab Take 1 tablet (20 mg total) by mouth 2 (two) times daily. 180 tablet 3    AMLODIPINE BESYLATE 2.5 MG Oral Tab Take 1 tablet (2.5 mg total) by mouth daily. (Patient not taking: Reported on 3/7/2022) 90 tablet 0    Metoprolol Succinate ER 50 MG Oral Tablet 24 Hr Take 1 tablet (50 mg total) by mouth once daily. (Patient not taking: Reported on 3/7/2022) 90 tablet 3    Vitamin B-12 500 MCG Oral Tab        ASPIRIN 325 MG OR TABS Take 81 mg by mouth daily.           The order for therapeutic phlebotomy and donor history was reviewed.  The donor's physical assessment and history meets acceptance criteria parameters.  This was completed by Christy NORRIS.    If the patient does not meet the criteria, please explain below:       Pathologist consulted:  No   Pathologist's name:   Outcome / Orders placed:                    Patient Name: Meliza Stafford  : 1935   Medical Record #: [id number]      Therapeutic Donor Physical and Record of Collection    Order:  Therapeutic Phlebotomy  Order Date:  10/01/2024   Expiration Date:  10/01/2025  Frequency:  Monthly  Parameters:  Therapeutic Phlebotomy for HGB > 12 g/dL  Ordering Physician:  Carmina Lopez MD   Fax Number:  [phone]      BP (!) 163/88 (BP Location: Left arm, Patient Position: Sitting, Cuff Size: adult)   Pulse 68   Temp 97.2 °F (36.2 °C) (Temporal)   Resp 18   Ht 1.524 m (5')   Wt 47.4 kg (104 lb 9.6 oz)   SpO2 95%   BMI 20.43 kg/m²       Acceptance Criteria:  Systolic blood pressure   mm/Hg  Diastolic blood pressure   mm/Hg  Pulse   beats per minute  Temperature  96-99.4 degrees Farenheit  Weight  >110 lbs     Hemoglobin:   16.6 g/dL  Acceptance Criteria:  HGB 11 GM/DL or above (Repeat if 10.5-10.9)    The order for therapeutic phlebotomy and donor history was reviewed.  The donor's physical assessment meets acceptance criteria parameters.  This was completed by Christy  H.    If the patient does not meet the criteria, please explain below:      Collection Information    Arm used:  right    Lot number of collection bag:  [id number]    Collection bag inspection acceptable:  Yes    Donor signature obtained for consent:  Yes    Time collection started:  14:36    Time collection completed:  14:51    (Discontinue collection at 15 minutes)   procedure stopped at 15 min without pt reaching full 500 mL    Reaction to procedure:  No     If yes, detailed description of reaction:  N/A    Weight of unit:  517 grams; 411 mL    Patient given post donation instructions:  Yes    Post collection blood pressure:  100/66    Collection completed by:  Christy NORRIS    Patient discharged:  Ambulatory    Patient accompanied by:  Friend    Patient is stable and was instructed to call the ordering physician with any questions / concerns.